# Patient Record
(demographics unavailable — no encounter records)

---

## 2024-11-14 NOTE — PLAN
[FreeTextEntry1] : Normal postmenopausal atrophic exam.  Patient will be seeing endocrinologist in January for evaluation of her bone density results  Weightbearing exercise has again been encouraged and she will follow-up in 1 year

## 2024-11-14 NOTE — PHYSICAL EXAM
[Chaperone Present] : A chaperone was present in the examining room during all aspects of the physical examination [[id number]] : A chaperone was present during the pelvic exam. [FreeTextEntry2] : Joselyn Lorenzo [Appropriately responsive] : appropriately responsive [Alert] : alert [No Acute Distress] : no acute distress [No Lymphadenopathy] : no lymphadenopathy [Soft] : soft [Non-tender] : non-tender [Non-distended] : non-distended [No HSM] : No HSM [No Lesions] : no lesions [No Mass] : no mass [Oriented x3] : oriented x3 [FreeTextEntry6] : The breasts are free of any masses axillary adenopathy or nipple discharge [Labia Majora] : normal [Labia Minora] : normal [Atrophy] : atrophy [Normal] : normal [Uterine Adnexae] : non-palpable [FreeTextEntry9] : Rectal exam and guaiac are negative

## 2024-11-14 NOTE — HISTORY OF PRESENT ILLNESS
[FreeTextEntry1] : Patient is here for her annual examination and has no gynecological or other health complaints.  She is scheduled to see an endocrinologist subsequent to our discussion about her last bone density test.  She states that she had a normal mammogram this past summer.  She states that she had a colonoscopy last year and was recommended to follow-up in 5 years by the gastroenterologist

## 2024-12-16 NOTE — ASSESSMENT
[FreeTextEntry1] : Reports increased since cessation of right ear blockage and pain since last visit, no drainage.  Exam today shows dry skin and wax accumulation right canal wall down cavity which was removed.  Epithelium overlying ET orifice was removed, resulting in improvement in symptoms.  Left ear canal patent and tympanic membrane intact.  Maintain dry ear precautions right ear, follow-up 3 months for repeat cleaning, patient would like to follow-up more frequently than every 6 months.

## 2025-01-06 NOTE — REVIEW OF SYSTEMS
[Recent Weight Loss (___ Lbs)] : recent weight loss: [unfilled] lbs [All other systems negative] : All other systems negative [Fatigue] : no fatigue [Blurred Vision] : no blurred vision [Neck Pain] : no neck pain [Chest Pain] : no chest pain [Palpitations] : no palpitations [Lower Ext Edema] : no lower extremity edema [Shortness Of Breath] : no shortness of breath [SOB on Exertion] : no shortness of breath on exertion [Nausea] : no nausea [Constipation] : no constipation [Abdominal Pain] : no abdominal pain [Vomiting] : no vomiting [Diarrhea] : no diarrhea [Joint Pain] : no joint pain [Muscle Weakness] : no muscle weakness [Headaches] : no headaches [Dizziness] : no dizziness [Tremors] : no tremors [Pain/Numbness of Digits] : no pain/numbness of digits [Polydipsia] : no polydipsia [Cold Intolerance] : no cold intolerance

## 2025-01-06 NOTE — HISTORY OF PRESENT ILLNESS
[Ibandronate (Boniva)] : Ibandronate [FreeTextEntry1] : 67-year-old Female with PMH gastric ulcer, chronic mastoiditis, conductive hearing loss came for evaluation of osteoporosis.   Patient had a DEXA scan in 2016 that showed osteopenia in Femoral neck -2.2 with normal BMD in spine.  She recently did a DEXA scan at the gynecology and was told that her DEXA scan showed that she has osteoporosis.  She does not have the DEXA scan.  History of Fractures: Foot fracture 11 years ago Age at menopause: age 54 in 2010 History of eating disorders, malnutrition or problems with nutrient absorption:  History of steroid use: Few months ago she was using prednisone 2 weeks ago History of use of anti-convulsant: Denies Recent falls or unstable gait: At fall, hip pain,  Loss of height: 5,2 Family history of osteoporosis or fractures: Sister broke hip, developed osteoporosis Past medical treatment for osteoporosis: Has never been Rx for osteoporosis Current treatment of osteoporosis: None Most recent DXA:  Report not available. Patient will contact the PCP office and will get the DEXA reports Vitamin D intake: 200 IU every other day Calcium intake:  Exercise: walks  1 hour every day Diet: Drink milk daily , sometimes eat yogurt Dental care: Has seen dentist 5 months ago, denies major dental problems  ROS: Denies loss of height.  Denies back or long bone pain.  Yes recently had a fall, denies fracture or gait instability.  Denies flank pain.  Denies hematuria. Denies smoking. Denies excessive alcohol intake.  Denies current history of steroid use.  Denies malabsorption. Denies history of hyperthyroidism/hyperparathyroidism. Denies history of kidney stones. Reports her sister had a traumatic fracture of hip and now have osteoporosis of that bone. Denies maternal osteoporosis

## 2025-01-06 NOTE — PHYSICAL EXAM
[Alert] : alert [Well Nourished] : well nourished [No Acute Distress] : no acute distress [Well Developed] : well developed [Normal Sclera/Conjunctiva] : normal sclera/conjunctiva [Normal Oropharynx] : the oropharynx was normal [Supple] : the neck was supple [Thyroid Not Enlarged] : the thyroid was not enlarged [No Thyroid Nodules] : no palpable thyroid nodules [No Edema] : no peripheral edema [Pedal Pulses Normal] : the pedal pulses are present [Normal Bowel Sounds] : normal bowel sounds [Not Tender] : non-tender [Not Distended] : not distended [Soft] : abdomen soft [Normal Anterior Cervical Nodes] : no anterior cervical lymphadenopathy [Normal Posterior Cervical Nodes] : no posterior cervical lymphadenopathy [No Spinal Tenderness] : no spinal tenderness [Spine Straight] : spine straight [No Stigmata of Cushings Syndrome] : no stigmata of Cushings Syndrome [Normal Gait] : normal gait [Normal Strength/Tone] : muscle strength and tone were normal [No Rash] : no rash [No Sensory Deficits] : the sensory exam was normal to light touch and pinprick [Normal Reflexes] : deep tendon reflexes were 2+ and symmetric [No Tremors] : no tremors [Oriented x3] : oriented to person, place, and time [Acanthosis Nigricans] : no acanthosis nigricans

## 2025-01-06 NOTE — ASSESSMENT
[FreeTextEntry1] : Advised patient to fax the DEXA scan report. obtain new baseline DEXA - check Ca, phos, Cr, Alk phos, albumin, 25OH vitamin D, PTH, TSH - continue calcium + vitamin D supplements Once the DEXA scan report received will plan to start anti osteoporotic medication. Discussed at length different medication options/ risks/ benefits and side effects including reclast, evenity and prolia. Patient cannot tolerate oral bisphosphonate due to hx of severe GERD.  RTC in one month

## 2025-01-16 NOTE — PHYSICAL EXAM
[Well Developed] : well developed [Well Nourished] : well nourished [Conjunctiva] : the conjunctiva were normal in both eyes [PERRL] : pupils were equal in size, round, and reactive to light [EOM Intact] : extraocular movements were intact [Normal Oropharynx] : the oropharynx was normal [Normal Nasal Mucosa] : the nasal mucosa was normal [Normal Appearance] : was normal in appearance [Neck Supple] : was supple [Enlarged Diffusely] : was not enlarged [Rate ___] : at [unfilled] breaths per minute [Normal Rhythm/Effort] : normal respiratory rhythm and effort [Clear Bilaterally] : the lungs were clear to auscultation bilaterally [Normal to Percussion] : the lungs were normal to percussion [Heart Rate ___] : [unfilled] bpm [Rhythm Regular] : regular [Normal Rate] : normal [Normal S1] : normal S1 [Normal S2] : normal S2 [S3] : no S3 [S4] : no S4 [No Murmur] : no murmurs heard [No Pitting Edema] : no pitting edema present [Rt] : varicose veins of the right leg noted [Lt] : varicose veins of the left leg noted [Right Carotid Bruit] : no bruit heard over the right carotid [Left Carotid Bruit] : no bruit heard over the left carotid [Right Femoral Bruit] : no bruit heard over the right femoral artery [Left Femoral Bruit] : no bruit heard over the left femoral artery [2+] : left 2+ [No Abnormalities] : the abdominal aorta was not enlarged and no bruit was heard [Bruit] : no bruit heard [Examination Of The Breasts] : a normal appearance [No Discharge] : no discharge [Soft, Nontender] : the abdomen was soft and nontender [No Mass] : no masses were palpated [No HSM] : no hepatosplenomegaly noted [Postauricular Lymph Nodes Enlarged Bilaterally] : nodes not enlarged [Preauricular Lymph Nodes Enlarged Bilaterally] : nodes not enlarged [Submandibular Lymph Nodes Enlarged Bilaterally] : nodes not enlarged [Suboccipital Lymph Nodes Enlarged Bilaterally] : nodes not enlarged [Cervical Lymph Nodes Enlarged Posterior Bilaterally] : nodes not enlarged [Submental Lymph Nodes Enlarged] : nodes not enlarged [Cervical Lymph Nodes Enlarged Anterior Bilaterally] : nodes not enlarged [Supraclavicular Lymph Nodes Enlarged Bilaterally] : nodes not enlarged [Axillary Lymph Nodes Enlarged Bilaterally] : nodes not enlarged [Epitrochlear Lymph Nodes Enlarged Bilaterally] : nodes not enlarged [Femoral Lymph Nodes Enlarged Bilaterally] : nodes not enlarged [Inguinal Lymph Nodes Enlarged Bilaterally] : nodes not enlarged [No Lymphangitis] : no lymphangitis observed [Normal Kyphosis] : normal kyphosis [No Visual Abnormalities] : no visible abnormalities [Normal Lordosis] : normal lordosis [No Scoliosis] : no scoliosis [No Tenderness to Palpation] : no spine tenderness on palpation [No Masses] : no masses [Full ROM] : full ROM [No Pain with ROM] : no pain with motion in any direction [Intact] : all reflexes within normal limits bilaterally [Normal Station and Gait] : the gait and station were normal [Normal Motor Tone] : the muscle tone was normal [Involuntary Movements] : no involuntary movements were seen [Normal Scalp] : inspection of the scalp showed no abnormalities [Examination Of The Hair] : texture and distribution of hair was normal [Abnormal Color] : normal color and pigmentation [Complexion Medium] : medium complexion [Skin Lesions 1] : no skin lesions were observed [Tattoo - Single] : no tattoos observed [Skin Turgor Decreased] : normal skin turgor [Normal] : the deep tendon reflexes were normal [Normal Mental Status] : the patient's orientation, memory, attention, language and fund of knowledge were normal [Appropriate] : appropriate [Impaired judgment] : intact judgment [Impaired Insight] : intact insight

## 2025-01-16 NOTE — HEALTH RISK ASSESSMENT
[Good] : ~his/her~  mood as  good [No] : In the past 12 months have you used drugs other than those required for medical reasons? No [One fall no injury in past year] : Patient reported one fall in the past year without injury [Little interest or pleasure doing things] : 1) Little interest or pleasure doing things [Feeling down, depressed, or hopeless] : 2) Feeling down, depressed, or hopeless [0] : 2) Feeling down, depressed, or hopeless: Not at all (0) [PHQ-2 Negative - No further assessment needed] : PHQ-2 Negative - No further assessment needed [Never] : Never [NO] : No [Patient reported mammogram was normal] : Patient reported mammogram was normal [Patient reported PAP Smear was normal] : Patient reported PAP Smear was normal [Patient reported colonoscopy was abnormal] : Patient reported colonoscopy was abnormal [HIV test declined] : HIV test declined [Hepatitis C test declined] : Hepatitis C test declined [None] : None [With Family] : lives with family [# of Members in Household ___] :  household currently consist of [unfilled] member(s) [Employed] : employed [High School] : high school [Single] : single [Feels Safe at Home] : Feels safe at home [Fully functional (bathing, dressing, toileting, transferring, walking, feeding)] : Fully functional (bathing, dressing, toileting, transferring, walking, feeding) [Fully functional (using the telephone, shopping, preparing meals, housekeeping, doing laundry, using] : Fully functional and needs no help or supervision to perform IADLs (using the telephone, shopping, preparing meals, housekeeping, doing laundry, using transportation, managing medications and managing finances) [Reports changes in hearing] : Reports changes in hearing [Smoke Detector] : smoke detector [Carbon Monoxide Detector] : carbon monoxide detector [Safety elements used in home] : safety elements used in home [FreeTextEntry1] : none  [de-identified] : walks daily 1 hr  [de-identified] : reg  [[id number]] : 0 [Change in mental status noted] : No change in mental status noted [Language] : denies difficulty with language [Behavior] : denies difficulty with behavior [Learning/Retaining New Information] : denies difficulty learning/retaining new information [Handling Complex Tasks] : denies difficulty handling complex tasks [Reasoning] : denies difficulty with reasoning [Spatial Ability and Orientation] : denies difficulty with spatial ability and orientation [Sexually Active] : not sexually active [Reports changes in vision] : Reports no changes in vision [Reports changes in dental health] : Reports no changes in dental health [Seat Belt] :  uses seat belt [Sunscreen] : does not use sunscreen [Travel to Developing Areas] : does not  travel to developing areas [TB Exposure] : is not being exposed to tuberculosis [Caregiver Concerns] : does not have caregiver concerns [MammogramDate] : 2024  [MammogramComments] : need to obtain  [PapSmearDate] : 11/24 [BoneDensityDate] : 7/24 [ColonoscopyComments] : polyp ascending colon tubular adenoma  [ColonoscopyDate] : 9/29/23 [de-identified] : last eye exam 1 yr  [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

## 2025-01-16 NOTE — HISTORY OF PRESENT ILLNESS
[FreeTextEntry1] : CPE  [de-identified] : pT IS A 67 YR OLD WOMAN WITH f Dyspepsia, hypothyroidism, positive PAULY, Vit D insufficiency, Incomplete RBBB, Sinus bradycardia, who comes in for annual wellness exam. she has developed tinnitus and went to ent  and evaluated.  she -denies any headaches, nausea, vomiting, fever, chills, sweats, chest pain, chest pressure, diarrhea, constipation, dysphagia, sour taste in the mouth, dizziness, leg swelling, leg pain, myalgias, arthralgias, itchy eyes, itchy ears, heartburn, or reflux.  She had gone to endocrinologist who states she had osteoporosis but bone density was not available  . I called and could not obtain report today will try again.   Pt has hx of gastric ulcer but refused fu egd   andunderstands risks  and need to  evaluate healing.

## 2025-01-16 NOTE — ASSESSMENT
[FreeTextEntry1] : health  She doesnt want flu vaccine  she will need dtap next yr . she is up to date with mammogram and bone density  and needs eye exam and dental . she has eye exam tomorrow.  She is up to date with colon cancer screening repeat  in 2028 .for tubular adenoma   2 bmi 21 healthy eating and exercise  3 osteoporosis  Exercise may decrease fracture risk by improving bone mass in premenopausal women and helping to maintain bone density in women who have been through menopause. Furthermore, exercise can strengthen your muscles, improve your balance, and make you less likely to have a fall that could lead to fracture or other injury. Most experts recommend exercising for at least 30 minutes three times per week. Many different types of exercise, including resistance training (eg, using free weights or resistance bands), jogging, jumping, and walking, are effective.Falling significantly increases the risk of osteoporotic fractures in older adults. Taking measures to prevent falls can decrease the risk of fractures. Such measures may include the following:  ?Removing loose rugs and electrical cords or any other loose items in the home that could lead to tripping, slipping, and falling.   ?Providing adequate lighting in all areas inside and around the home, including stairwells and entrance ways.   ?Avoiding walking on slippery surfaces, such as ice or wet or polished floors.   ?Avoiding walking in unfamiliar areas outside.   ?Reviewing drug regimens to replace medications that may increase the risk of falls with those that are less likely to do so.   ?Visiting an ophthalmologist or optometrist regularly to check your vision. The relationship between bone density and fracture risk in premenopausal women (ie, those who have not yet gone through menopause) is not well defined. A premenopausal woman with low bone density may have little increased risk of fracture. Thus, bone density alone should not be used to diagnose osteoporosis in a premenopausal woman; further evaluation for other potential causes of bone loss is generally recommended.  Bisphosphonates  Bisphosphonates are medications that slow the breakdown and removal of bone (ie, resorption). They are widely used for the prevention and treatment of osteoporosis in postmenopausal women.  These drugs need to be taken first thing in the morning on an empty stomach with a full 8 oz glass of plain (not sparkling) water. You then need to wait for a half hour or an hour, depending on which one you take, before eating or taking any other medications:  ?Alendronate (brand name: Fosamax) or risedronate (brand names: Actonel, Atelvia)  If you take either of these drugs, wait at least half an hour. (See 'Risedronate' below and 'Ibandronate' below.)   ?Ibandronate (brand name: Boniva)  If you take this drug, wait at least one hour. (See 'Ibandronate' below.)   These instructions help ensure that the drugs will be absorbed and also reduce the risk of side effects and potential complications.  A "delayed-release" formulation of risedronate is also available. Unlike immediate-release risedronate and other oral bisphosphonates, delayed-release risedronate is taken immediately after breakfast and with at least 4 ounces of water.  After taking any oral bisphosphonate, remain upright (sitting or standing) for at least 30 minutes to minimize the risk of acid reflux and other gastrointestinal side effects. (See 'Side effects of bisphosphonates' below.)  If you are at high risk for breaking a bone, you can safely take osteoporosis medicines for many years. However, most people can stop taking alendronate, risedronate, or ibandronate after five years. This is because these drugs have residual benefit, even after you stop them. This approach also minimizes side effects from long-term use. Your doctor will continue to monitor your bone density to determine if you need to start medication again.  Side effects of bisphosphonates  Most people who take bisphosphonates do not have any serious side effects related to the medication. However, it is important to closely follow the instructions for taking the medication; lying down or eating sooner than the recommended time after a dose increases the risk of stomach upset.  There has been concern about use of bisphosphonates in people who require invasive dental work. A problem known as osteonecrosis of the jaw has developed in people who used bisphosphonates. The risk of this problem is very small in people who take bisphosphonates for osteoporosis prevention and treatment. However, there is a slightly higher risk of this problem when higher doses of bisphosphonates are given into a vein during cancer treatment.  For people who take bisphosphonates to treat osteoporosis, experts do not think that it is necessary to stop the medication before invasive dental work (eg, tooth extraction or implant). However, people who take a bisphosphonate as part of a treatment for cancer should consult their doctor before having invasive dental work.  Taking bisphosphonates for a long time (eg, seven years or longer) can rarely increase the risk of an unusual type of femur (thigh bone) fracture. Taking bisphosphonates for up to five years for osteoporosis (the usual duration of treatment) is usually not associated with these "atypical" fractures, and the benefits outweigh the risk of this rare side effect.  Alendronate  Alendronate (brand names: Binosto, Fosamax) reduces the risk of vertebral and hip fractures, and it decreases the loss of height associated with vertebral fractures. It is available as a pill that is taken once per day or once per week.  Risedronate  Risedronate (brand names: Actonel, Atelvia) reduces the risk of both vertebral and hip fractures. Risedronate is approved for both prevention and treatment of osteoporosis. It can be taken once per day, once per week, or once per month.  Ibandronate  Although ibandronate reduces the risk of bone loss and vertebral fractures, there is no proof that it reduces the risk of hip fractures, so it is not recommended as often as alendronate and risedronate. Ibandronate (brand name: Boniva) can be used for prevention and treatment of osteoporosis. It is available as a pill that is taken once per day or once per month. It is also available as an injection that is given into a vein once every three months.  Zoledronic acid  A once-yearly, intravenous dose of zoledronic acid (sample brand name: Reclast) is also available for the treatment of osteoporosis. This medication is given into a vein (by "IV") over 15 minutes and is usually well tolerated. Zoledronic acid can improve bone density and decrease the risk of vertebral and hip fractures.  Side effects of zoledronic acid can include flu-like symptoms within 24 to 72 hours of the first dose. This may include a low-grade fever and muscle and joint pain. Treatment with a fever-reducing medication (acetaminophen) generally improves the symptoms. Subsequent doses typically cause milder symptoms.  Intravenous zoledronic acid is an appealing alternative for people who cannot tolerate oral bisphosphonates or who prefer a once yearly to a monthly, weekly, or daily regimen. Zoledronic acid is usually given for three years and then discontinued. Your doctor will monitor your bone density to see if it needs to be restarted.  "Estrogen-like" medications  Certain medications, known as selective estrogen receptor modulators (SERMs), produce some estrogen-like effects in the bone. These medications, which include raloxifene (brand name: Evista) and tamoxifen, provide protection against postmenopausal bone loss. In addition, SERMs decrease the risk of breast cancer in women who are at high risk. Raloxifene can be used for the prevention and treatment of osteoporosis in postmenopausal women, although it may be less effective in preventing bone loss than bisphosphonates or estrogen (see 'Hormone therapy' below). Tamoxifen is usually given to women with breast cancer to reduce the risk of recurrence, or to women who have never had breast cancer but are at high risk of developing it. (See "Patient education: Medications for the prevention of breast cancer (Beyond the Basics)".)  SERMs are not recommended for premenopausal women.  Hormone therapy  In the past, hormone therapy with estrogen or estrogen-progestin was consider 4 perforated right ear tm  fu with ent  and left ear wax  5. allergy she has  allergy on face along nasial labial folds.  and if flouride  i would expect it would be around her mouth lips   and the erythema is only nasolabial fold .    will test for allergies and aske pt to use tooth paste without fluoride  such as Toms.   6. bunion  not symptomatic.  7 hld  to lower  LDL and non HDL  cholesterol levels     1 limit your intake of foods full of saturated fats  , trans fats, and dietary cholesterol .  Food with a lot of saturated fate include butter, fatty flesh like red meat, full fat and low fat dairy  products  , palm oil and coconut oil .   If you see partially hydrogenated fat in the ingredient  list of food label that food has trans fats.  Top sources of dietary chol include egg yolks , organ meats and shell fish.  one Type of fat omega 3 Fatty acids has been shown to protect against heart disease  . Good sources are cold water fish like salmon, mackerel , halibut ., trout  herring and sardines.     Limit  your intake of meat , poultry and fish to no more than 3.5  ounces per day     Eat a lot more fiber rich foods  like beans , oats, barley fruits and vegetables   .  Food naturally rich in soluble fiber  are good at lowering cholesterol .    Excellent choices  are  oats , oat bran , barley , peas , yams sweet potatoes and legumes  or beans .  Good fruit sources are berries passion fruit, oranges pears,, apricots , apples  and nectarines  .    choose protein rich plant foods   such as legumes or beans nuts and seeds over meat.     Lose as much weight as possible and exercise   Take plant sterol supplements   .A Daily intake  of 1-2 gms  of plant sterols  lowers ldl .    Best choice is supplements  such as Cholestoff  by natures made   because they dont have calories  sugar trans fats   an salt  of many foods enriched with plant sterols.    Take  Metamucil or psyllium   .   Studies have shown 9-10 gms as daily of psyllium   the equivalent of  about  3 teaspoons  of sugar free Metamucil   reduced LDL levels  CAT scans are the only radiological modality to identify abnormalities w/in the lungs with regards to nodules/masses/lymph nodes. Risks, benefits were reviewed in detail. The guidelines for abnormalities include follow up CT scans at various intervals which could range from 6 weeks to 1 year intervals. If there is a change for the worse then consideration for a biopsy will be considered if you are a candidate. Second opinion evaluation with a thoracic surgeon or an interventional radiologist could be offered.  -Lung Cancer Screening is recommended for people between the ages of 55 and 80 with prior 30+ pack year smoking histories. There is irrefutable evidence for realization of lung cancer screening based on two large randomized control trials demonstrating relative reduction in lung cancer mortality for patients undergoing low-dose CT scanning. Risks and benefits reviewed with the patient.    8 gerd improved and takes ppi and h2 blocker intermittently when needed. discussed Rule of 2's; pt should avoid eating too much; too fast; too spicy; too lousy; less than two hours before bed  -Things to avoid including overeating, spicy foods, tight clothing, eating within three hours of bed, this list is not all inclusive.  -For treatment of reflux, possible options discussed including diet control, H2 blockers, PPIs, as well as coating motility agents discussed as treatment options. Timing of meals and proximity of last meal to sleep were discussed. If symptoms persist, a formal gastrointestinal evaluation is needed.  9. hematuria  test  ua

## 2025-01-16 NOTE — COUNSELING
[Fall prevention counseling provided] : Fall prevention counseling provided [Adequate lighting] : Adequate lighting [No throw rugs] : No throw rugs [Use proper foot wear] : Use proper foot wear [Sleep ___ hours/day] : Sleep [unfilled] hours/day [Engage in a relaxing activity] : Engage in a relaxing activity [Plan in advance] : Plan in advance [____ min/wk Activity] : [unfilled] min/wk activity [FreeTextEntry2] : bmi 21  weight 116

## 2025-02-06 NOTE — HISTORY OF PRESENT ILLNESS
[FreeTextEntry1] : 67-year-old Female with PMH gastric ulcer, chronic mastoiditis, conductive hearing loss came for evaluation of osteoporosis.  Patient had a DEXA scan in 2016 that showed osteopenia in Femoral neck -2.2 with normal BMD in spine. She recently did a DEXA scan at the gynecology and was told that her DEXA scan showed that she has osteoporosis. She does not have the DEXA scan. -2.2  -2.7  History of Fractures: Foot fracture 11 years ago Age at menopause: age 54 in 2010 History of eating disorders, malnutrition or problems with nutrient absorption: History of steroid use: Few months ago she was using prednisone 2 weeks ago History of use of anti-convulsant: Denies Recent falls or unstable gait: At fall, hip pain, years ago Loss of height: 5,2 Family history of osteoporosis or fractures: Sister broke hip, developed osteoporosis Past medical treatment for osteoporosis: Has never been Rx for osteoporosis Current treatment of osteoporosis: None Most recent DXA: Report not available. Patient will contact the PCP office and will get the DEXA reports Vitamin D intake: 200 IU every other day Calcium intake: Exercise: walks 1 hour every day Diet: Drink milk daily , sometimes eat yogurt Dental care: Has seen dentist 5 months ago, denies major dental problems  ROS: Denies loss of height. Denies back or long bone pain. Yes recently had a fall, denies fracture or gait instability. Denies flank pain. Denies hematuria. Denies smoking. Denies excessive alcohol intake. Denies current history of steroid use. Denies malabsorption. Denies history of hyperthyroidism/hyperparathyroidism. Denies history of kidney stones. Reports her sister had a traumatic fracture of hip and now have osteoporosis of that bone. Denies maternal osteoporosis    HAILEY MOHANLINDA is being seen for Osteoporosis. Past treatment has included Ibandronate.

## 2025-03-18 NOTE — ASSESSMENT
[FreeTextEntry1] : Exam today shows dry right canal without cavity, dry skin and debris accumulation removed in entirety.  Topical powder reapplied.  Bilateral facial nerve function normal.  Maintain dry ear precautions right ear, follow-up 4 months for ear cleaning.

## 2025-03-18 NOTE — PROCEDURE
[FreeTextEntry3] : Procedure note:  Right mastoid debridement.  Mar 18, 2025   Description of Procedure:  Mastoid debridement was performed under the operating microscope using otologic instrumentation.  The patient tolerated the procedure without complications.

## 2025-05-29 NOTE — ASSESSMENT
[FreeTextEntry1] : 1 back pain  Unless acute low back pain is caused by a serious medical condition (which is uncommon), it typically resolves fairly quickly, even if there is a bulging or herniated disc. Still, low back pain can make it hard to do your usual activities, and it can be frustrating to feel like you just have to wait for it to get better. Below are some simple things you can do that may help relieve your pain. Remaining active  Many people are afraid that they will hurt their back further or delay recovery by remaining active. However, remaining active, to the extent that you are able, is one of the best things you can do for your back. If you have severe pain, you may need to rest your back for a day or so. It may be most comfortable to lie on your back with a pillow under your knees and your head and shoulders elevated. For sleeping, you may want to lie on your side with your upper knee bent and a pillow between your knees. However, prolonged bed rest is not recommended. Studies have shown that people with low back pain recover faster when they remain active. Movement helps to relieve muscle spasms and prevents loss of muscle strength. While you should avoid strenuous activities and sports while you are in pain, it is fine to continue doing regular day-to-day activities and light exercises, such as walking. If certain activities cause your back to hurt too much, try something else instead. Heat  Using a heating pad or heated wrap can help with low back pain during the first few weeks. It is not clear if cold helps as well, but some people may find that it relieves pain temporarily. Modifications at work  Most experts recommend that people with low back pain continue to work so long as it is possible to avoid prolonged standing or sitting and heavy lifting. If your job does not allow you to sit or stand comfortably, you may need to take some time off work while you recover. While standing at work, stepping on a block of wood with one foot (and periodically alternating the foot on the block) may be helpful. Pain medications  You can try taking an over-the-counter medication to help relieve pain. Nonsteroidal antiinflammatory drugs (NSAIDs), such as ibuprofen (sample brand names: Advil, Motrin) and naproxen (brand name: Aleve), may work better than acetaminophen (brand name: Tylenol) for low back pain. If you do take pain medication, it may be more effective to take a dose on a regular basis for three to five days, rather than using the medication only when your pain becomes unbearable. Muscle relaxants (eg, cyclobenzaprine [brand name: Flexeril]) are prescription medications; while these may help relieve back pain, they can cause drowsiness and are probably no better than ibuprofen in relieving pain. Your health care provider can talk to you about whether muscle relaxants might help in your situation. They may be helpful before bedtime when used for a short time, ie, a week or two. People who need to be alert, such as while driving or operating machinery, should not use muscle relaxants. Opioids (drugs derived from morphine) are not recommended for most people with back pain. In rare situations, a health care provider might prescribe them for a few days if a person has severe pain that is not responding to other treatments, but they are generally not much more effective than NSAIDs. In addition, opioids have a relatively high risk of side effects and the potential to cause harm, including the risk of dependency and abuse. Exercise  Starting a new exercise program immediately after a new episode of low back pain will not speed recovery from the acute episode. However, there is evidence that exercise is beneficial in people with chronic back pain. Spinal manipulation  "Spinal manipulation" is a technique sometimes used by chiropractors, physical therapists, osteopaths, massage therapists, and others to relieve back pain. It involves moving the joints of the spine beyond the normal range of motion. Studies suggest that spinal manipulation may provide modest pain relief and improved function, and it generally appears to be safe if performed by an experienced professional. If you are interested in trying this approach, talk with your health care provider about how to integrate it into your treatment plan. Acupuncture  Acupuncture involves inserting very fine needles into specific points, as determined by traditional Chinese maps of the body's flow of energy. There is not consistent evidence that acupuncture is effective for people with acute low back pain; however, some people find it helpful. Massage  There is no evidence that massage is effective in treating acute low back pain. However, you may find that it is generally relaxing and helps you feel better temporarily. Psychological therapy  In some cases, mental health issues can contribute to low back pain. Psychological therapy has mostly been studied in the context of treating longer-term (chronic) back pain; however, it may be beneficial for some people with acute pain as well. Other treatments  You may have heard of other treatments that claim to relieve back pain. Unfortunately, most of these have not been proven to work or are only effective in specific situations. Examples include: ?Injections  Some clinicians recommend injections of a local anesthetic (numbing medication) into the soft tissues of the back, although it is not clear if these injections are effective. The areas targeted by these injections are called "trigger points." Trigger-point injections may be of benefit in some people with chronic back pain, but they are typically not recommended for treating acute pain. Injections of a glucocorticoid (steroid) medication are sometimes recommended for people with chronic low back pain with sciatica or radiculopathy The injection is given into the epidural space, which is located below the spinal cord. Epidural glucocorticoid injections do appear to improve pain slightly at two and six weeks after the injection, but not at 3, 6, or 12 months after the injection. There is no evidence that epidural steroid injections are helpful for people with back pain without sciatica. ?Corsets and braces  While wearable supportive garments may claim to help relieve or prevent low back pain, these are typically not effective. ?Traction  Traction involves the use of weights to realign or pull the spinal column into alignment. Clinical studies have shown no benefit from traction in the treatment of acute back pain. ?Switching to a firmer mattress  People often wonder if sleeping on a firmer mattress can help prevent or treat low back pain. Small studies have suggested that using a less firm mattress may actually be more likely to relieve pain; however, there is not enough evidence to support switching to a specific type of sleeping surface for this reason. ?Methods involving energy or electricity  Other interventions include ultrasound, interferential therapy, shortwave diathermy, transcutaneous electrical nerve stimulation, and low-level laser therapy, all of which involve applying energy to the skin's surface. None of these interventions have been proven to be effective, particularly during the first four to six weeks of an episode of back pain. CHRONIC LOW BACK PAIN TREATMENTWhile most people recover completely from an episode of acute low back pain, some people do go on to have longer-term pain. Chronic pain is typical  2 fall Make your home safer  To avoid falling at home, get rid of things that might make you trip or slip. This might include furniture, electrical cords, clutter, and loose rugs Keep your home well-lit so that you can easily see where you are going. Avoid storing things in high places so you don't have to reach or climb. ?Wear sturdy shoes that fit well  Wearing shoes with high heels or slippery soles, or shoes that are too loose, can lead to falls. Walking around in bare feet, or only socks, can also increase your risk of falling. ?Take vitamin D pills  Taking vitamin D might lower the risk of falls in older people. This is because vitamin D helps make bones and muscles stronger. Your doctor can talk to you about whether you should take extra vitamin D, and how much. ?Stay active  Exercising on a regular basis can help lower your risk of falling. It might also help prevent you from getting hurt if you do fall. It is best to do a few different activities that help with both strength and balance. There are many kinds of exercise that can be safe for older people. These include walking, swimming, and Markos Chi (a Chinese martial art that involves slow, gentle movements). ?Use a cane, walker, and other safety devices  If your doctor recommends that you use a cane or walker, be sure that it's the right size and you know how to use it. There are other devices that might help you avoid falling, too. These include grab bars or a sturdy seat for the shower, non-slip bath mats, and hand rails or treads for the stairs (to prevent slipping). If you worry that you could fall, there are also alarm buttons that let you call for help if you fall and can't get up. 3 osteoporosis check vit d level  and fu with Lily   4. gerd  improved  reduced ppi to 20 mg   and  hold famotadine   Gastroesophageal reflux disease (GERD) occurs when stomach acid frequently flows back into the tube connecting your mouth and stomach (esophagus). This backwash (acid reflux) can irritate the lining of your esophagus.  Many people experience acid reflux from time to time. GERD is mild acid reflux that occurs at least twice a week, or moderate to severe acid reflux that occurs at least once a week.   Most people can manage the discomfort of GERD with lifestyle changes and over-the-counter medications. But some people with GERD may need stronger medications or surgery to ease symptoms.  Symptoms  Common signs and symptoms of GERD include: A burning sensation in your chest (heartburn), usually after eating, which might be worse at night Chest pain Difficulty swallowing Regurgitation of food or sour liquid Sensation of a lump in your throat  If you have nighttime acid reflux, you might also experience: Chronic cough Laryngitis New or worsening asthma Disrupted sleep  When to see a doctor  Seek immediate medical care if you have chest pain, especially if you also have shortness of breath, or jaw or arm pain. These may be signs and symptoms of a heart attack.  Make an appointment with your doctor if you: Experience severe or frequent GERD symptoms Take over-the-counter medications for heartburn more than twice a week  Causes  GERD is caused by frequent acid reflux.  When you swallow, a circular band of muscle around the bottom of your esophagus (lower esophageal sphincter) relaxes to allow food and liquid to flow into your stomach. Then the sphincter closes again.  If the sphincter relaxes abnormally or weakens, stomach acid can flow back up into your esophagus. This constant backwash of acid irritates the lining of your esophagus, often causing it to become inflamed.  Risk factors  Conditions that can increase your risk of GERD include: Obesity Bulging of the top of the stomach up into the diaphragm (hiatal hernia) Pregnancy Connective tissue disorders, such as scleroderma Delayed stomach emptying  Factors that can aggravate acid reflux include: Smoking Eating large meals or eating late at night Eating certain foods (triggers) such as fatty or fried foods Drinking certain beverages, such as alcohol or coffee Taking certain medications, such as aspirin  Complications  Over time, chronic inflammation in your esophagus can cause: Narrowing of the esophagus (esophageal stricture). Damage to the lower esophagus from stomach acid causes scar tissue to form. The scar tissue narrows the food pathway, leading to problems with swallowing. An open sore in the esophagus (esophageal ulcer). Stomach acid can wear away tissue in the esophagus, causing an open sore to form. An esophageal ulcer can bleed, cause pain and make swallowing difficult. Precancerous changes to the esophagus (Carroll's esophagus). Damage from acid can cause changes in the tissue lining the lower esophagus. These changes are associated with an increased risk of esophageal cancer 5 ofelia positive  I explained that a positive OFELIA may occur secondary to polyclonal activation of the immune system following an infection, or it may be positive without any identifiable reason/disease in approximately 5 - 15% of the population.   There was no evidence of any underlying rheumatologic disease based on history or exam.  I did recommend specific serologic tests to evaluate whether the OFELIA is clinically significant.  I advised the family to call if new concerning symptoms arise (i.e. persistent unexplained fever, weight loss, unusual rash, mouth sores, joint pain/swelling/ warmth/ stiffness) so that reevaluation can be arranged as these symptoms may be a sign of a newly developed problem. 6  thyroid disorder  repat presently on no meds  due to normal tsh    7. glucose6 phosphatase def  family hx  of all sibling with this     8 hld  to lower  LDL and non HDL  cholesterol levels     1 limit your intake of foods full of saturated fats  , trans fats, and dietary cholesterol .  Food with a lot of saturated fate include butter, fatty flesh like red meat, full fat and low fat dairy  products  , palm oil and coconut oil .   If you see partially hydrogenated fat in the ingredient  list of food label that food has trans fats.  Top sources of dietary chol include egg yolks , organ meats and shell fish.  one Type of fat omega 3 Fatty acids has been shown to protect against heart disease  . Good sources are cold water fish like salmon, mackerel , halibut ., trout  herring and sardines.     Limit  your intake of meat , poultry and fish to no more than 3.5  ounces per day     Eat a lot more fiber rich foods  like beans , oats, barley fruits and vegetables   .  Food naturally rich in soluble fiber  are good at lowering cholesterol .    Excellent choices  are  oats , oat bran , barley , peas , yams sweet potatoes and legumes  or beans .  Good fruit sources are berries passion fruit, oranges pears,, apricots , apples  and nectarines  .    choose protein rich plant foods   such as legumes or beans nuts and seeds over meat.     Lose as much weight as possible and exercise   Take plant sterol supplements   .A Daily intake  of 1-2 gms  of plant sterols  lowers ldl .    Best choice is supplements  such as Cholestoff  by natures made   because they dont have calories  sugar trans fats   an salt  of many foods enriched with plant sterols.    Take  Metamucil or psyllium   .   Studies have shown 9-10 gms as daily of psyllium   the equivalent of  about  3 teaspoons  of sugar free Metamucil   reduced LDL levels  . 9 tinnitus  .tinnitus Behavioral therapies  A number of behavioral therapies can help a person to live with chronic tinnitus.  Tinnitus retraining therapy  Tinnitus retraining therapy (TRT) involves retraining the subconscious part of the auditory system to accept the sounds associated with tinnitus as normal, natural sounds rather than annoying sounds [4]. The goal is for the person to become unaware of their tinnitus unless they consciously choose to focus on it.  TRT is performed by experts in a tinnitus center; it includes counseling as well as the use of a wearable device that emits low-level noise and environmental sounds. Although TRT has demonstrated short-term success in many tinnitus sufferers, it requires a commitment to the program; the noise-generating device may need to be worn for one to two years. Studies show up to 80 percent of patients find some relief of their tinnitus with TRT.  Masking  Masking devices resemble hearing aids and produce low-level sounds, which help to reduce or eliminate the tinnitus noise in some patients [5]. Masking does not relieve tinnitus in all patients. Some people report a worsening of their tinnitus with masking. Those who do experience relief typically find that tinnitus returns after the masking device is removed.  Patients can also achieve a lesser degree of masking by listening to quiet music or creating low background noise with a radio on low volume, a fan, a white noise machine, or pillow speakers. This may be especially helpful for people with tinnitus that is bothersome in quiet environments.  Biofeedback and stress reduction  Biofeedback is a relaxation technique that teaches a person to control certain body functions, such as heart and breathing rate. Biofeedback may help people to manage tinnitus-related distress by changing their reaction to it. Some people experience relief of tinnitus symptoms once they are able to stop thinking of their tinnitus as bothersome or stressful [6].  Cognitive behavioral therapy  The goal of cognitive behavioral therapy (CBT) is to teach patients to manage their psychological responses to tinnitus. It involves using coping strategies, distraction skills, and relaxation techniques.  Other therapies  A number of other types of treatments for tinnitus have been studied, although none have been found to reliably be more effective than placebo. Nevertheless, many tinnitus support groups have members who are helped by these treatments. Individual patients who respond may be experiencing a true benefit.  ?Electrical stimulation  Electrical stimulation of certain parts of the inner ear can be accomplished by placing electrodes on the skin or using acupuncture needles (see below). There are different modes of delivering electrical stimulation, depending on the patient's situation and other factors such as whether hearing loss is present.   ?Acupuncture  Acupuncture involves inserting hair-thin metal needles into the skin at specific points on the body. It causes little to no pain. Acupuncture may be used alone or in combination with electric stimulation.   ?Repetitive transcranial magnetic stimulation  During repetitive transcranial magnetic stimulation (rTMS), a powerful magnetic field is used to stimulate the brain. This treatment shows promise for some tinnitus sufferers but is still experimental.   ?Herbal remedies  Combinations of herbs (also called botanicals) are often promoted as treatments for medical problems. Herbal medicines may come in the form of a powder, liquid, or pill. Ginkgo biloba and melatonin have both been studied for use in treating tinnitus. While some patients report benefit from these types of treatment, there are no studies with high rates of improvement in tinnitus from herbal remedies.   ?Vitamins and minerals  Vitamins and minerals that have been studied for treatment of tinnitus and inner-ear health include niacin (a B vitamin), zinc, and copper. While some patients report benefit from these types of treatment, there are no studies with high rates of improvement in tinnitus from vitamin or mineral remedies.

## 2025-05-29 NOTE — HISTORY OF PRESENT ILLNESS
[FreeTextEntry1] : fu [de-identified] : Pt is a 67 yr old woman with osteoporosis and has seen Lily and to start infusion  .  She -denies any headaches, nausea, vomiting, fever, chills, sweats, chest pain, chest pressure, diarrhea, constipation, dysphagia, sour taste in the mouth, dizziness, leg swelling, leg pain, myalgias, arthralgias, itchy eyes, itchy ears, heartburn, or reflux.  she had elevated chol 226  ldl 135  She had ofelia  elevated 1:160 and has anti thyroid ab  positive  and normal tsh.   Pt states she fell a few months ago at work on her back she slipped after cleaning with her foot and slipped  and wet.  She had pain then and then it resolved on its own.  Presently she has   lower back pain at waist line and only when she bends  and started one month ago.  The pain level is 8/10  when bending. She doesnt have pain climbing up and down stairs  .  She has it when she is sitting for a long time. she has not taken anything for it.  she doesnt have pain with walking.  She is taking pantoprazole and famotidine and takes it when needed.  she takes it once every 1-2 weeks. Pt states several of her siblings have glucose 6 phos def.

## 2025-05-29 NOTE — COUNSELING
[Fall prevention counseling provided] : Fall prevention counseling provided [Adequate lighting] : Adequate lighting [No throw rugs] : No throw rugs [Use proper foot wear] : Use proper foot wear [Sleep ___ hours/day] : Sleep [unfilled] hours/day [Engage in a relaxing activity] : Engage in a relaxing activity [Plan in advance] : Plan in advance

## 2025-05-29 NOTE — PHYSICAL EXAM
[No Acute Distress] : no acute distress [Well Nourished] : well nourished [Well Developed] : well developed [Well-Appearing] : well-appearing [Normal Sclera/Conjunctiva] : normal sclera/conjunctiva [PERRL] : pupils equal round and reactive to light [EOMI] : extraocular movements intact [Normal Outer Ear/Nose] : the outer ears and nose were normal in appearance [Normal Oropharynx] : the oropharynx was normal [No JVD] : no jugular venous distention [No Lymphadenopathy] : no lymphadenopathy [Supple] : supple [Thyroid Normal, No Nodules] : the thyroid was normal and there were no nodules present [No Respiratory Distress] : no respiratory distress  [No Accessory Muscle Use] : no accessory muscle use [Clear to Auscultation] : lungs were clear to auscultation bilaterally [Normal Rate] : normal rate  [Regular Rhythm] : with a regular rhythm [Normal S1, S2] : normal S1 and S2 [No Murmur] : no murmur heard [No Carotid Bruits] : no carotid bruits [No Abdominal Bruit] : a ~M bruit was not heard ~T in the abdomen [No Varicosities] : no varicosities [Pedal Pulses Present] : the pedal pulses are present [No Edema] : there was no peripheral edema [No Palpable Aorta] : no palpable aorta [No Extremity Clubbing/Cyanosis] : no extremity clubbing/cyanosis [Soft] : abdomen soft [Non Tender] : non-tender [Non-distended] : non-distended [No HSM] : no HSM [Normal Bowel Sounds] : normal bowel sounds [Normal Posterior Cervical Nodes] : no posterior cervical lymphadenopathy [Normal Anterior Cervical Nodes] : no anterior cervical lymphadenopathy [No CVA Tenderness] : no CVA  tenderness [No Spinal Tenderness] : no spinal tenderness [Normal Kyphosis] : normal kyphosis [No Visual Abnormalities] : no visible abnormalities [Normal Lordosis] : normal lordosis [No Scoliosis] : no scoliosis [No Tenderness to Palpation] : no spine tenderness on palpation [No Masses] : no masses [Full ROM] : full ROM [No Pain with ROM] : no pain with motion in any direction [Intact] : all reflexes within normal limits bilaterally [No Joint Swelling] : no joint swelling [Grossly Normal Strength/Tone] : grossly normal strength/tone [No Rash] : no rash [Coordination Grossly Intact] : coordination grossly intact [No Focal Deficits] : no focal deficits [Normal Gait] : normal gait [Deep Tendon Reflexes (DTR)] : deep tendon reflexes were 2+ and symmetric [Normal Affect] : the affect was normal [Normal Insight/Judgement] : insight and judgment were intact [Normal] : affect was normal and insight and judgment were intact

## 2025-05-29 NOTE — HEALTH RISK ASSESSMENT
[No] : In the past 12 months have you used drugs other than those required for medical reasons? No [No falls in past year] : Patient reported no falls in the past year [Little interest or pleasure doing things] : 1) Little interest or pleasure doing things [Feeling down, depressed, or hopeless] : 2) Feeling down, depressed, or hopeless [0] : 2) Feeling down, depressed, or hopeless: Not at all (0) [PHQ-2 Negative - No further assessment needed] : PHQ-2 Negative - No further assessment needed [de-identified] : endo Lily dillard  [de-identified] : walks  [de-identified] : reg [[id number]] : 0 [Never] : Never

## 2025-07-28 NOTE — ASSESSMENT
[FreeTextEntry1] : Patient developed side effects after getting IV Reclast infusion including hypocalcemia and hypokalemia. Advised patient to start calcium supplements 600 mg twice daily, advised to start taking vitamin D 1000 international unit daily.  Patient does not want to take large potassium pills.  Discussed to increase potassium containing foods such as banana. Check the CMP this week. Will discontinue the IV Reclast and will discuss other options of osteoporosis in future. Advised patient to inform if she is unable to swallow the Cefuroxime for 7 days.  Will prescribe another third-generation cephalosporin.  Advised to follow up with PCP for cellulitis Rx.  .  RTC in  Aug 2025

## 2025-07-28 NOTE — HISTORY OF PRESENT ILLNESS
[FreeTextEntry1] : 67-year-old Female with PMH gastric ulcer, chronic mastoiditis, conductive hearing loss came for osteoporosis follow-up  Osteoporosis history  Patient had a DEXA scan in 2016 that showed osteopenia in Femoral neck -2.2 with normal BMD in spine. She recently did a DEXA scan at the gynecology in August 2024 that showed L1-L4 T-score : -2.2 Left femoral neck T-score :-2.7  History of Fractures: Traumatic foot fracture 11 years ago Age at menopause: age 54 in 2010 History of eating disorders, malnutrition or problems with nutrient absorption: History of steroid use: None History of use of anti-convulsant: Denies Recent falls or unstable gait: She had a mechanical fall 1 year ago Loss of height: 5.2 Family history of osteoporosis or fractures: Sister had a hip fracture and then developed osteoporosis Past medical treatment for osteoporosis: Has never been on Rx for osteoporosis Current treatment of osteoporosis: Patient received IV Reclast in first week of July 2025 and then she developed fever for 2 days and myalgias that started in 1 to 2 weeks.  Her other symptoms were severe headache and bilateral lower leg muscle stiffness.  She then developed right leg swelling, redness, warmth and pain.  She went to the ED 2 days ago and was found to have hypokalemia with potassium level of 3. 4 and hypocalcemia with serum calcium level of 8.1.  The ultrasound of the leg showed no DVT however she was treated with IV antibiotic for cellulitis.  She was advised to take potassium chloride tablet however she refused she was discharged on cefuroxime.  Patient is now taking cefuroxime tablet however he states is too big in size and she has to cut it with a pill cutter.  He states she cannot swallow the big pills.  She has eaten 2 bananas to improve her potassium level. She still has symptoms of fatigue and headache, reports her leg swelling has improved however still its red and inflamed. Most recent DXA: As above Vitamin D intake: 200 IU every other day Calcium intake: Does not take Exercise: walks 1 hour every day Diet: Drink milk daily , sometimes eat yogurt Dental care: Has seen dentist 5 months ago, denies major dental problems  ROS: Fatigue, headache, myalgias and right leg swelling Reports her sister had a traumatic fracture of hip and now have osteoporosis of that bone. Denies maternal osteoporosis

## 2025-07-28 NOTE — PHYSICAL EXAM
[Alert] : alert [Well Nourished] : well nourished [No Acute Distress] : no acute distress [Well Developed] : well developed [Normal Sclera/Conjunctiva] : normal sclera/conjunctiva [Normal Oropharynx] : the oropharynx was normal [Supple] : the neck was supple [Thyroid Not Enlarged] : the thyroid was not enlarged [No Thyroid Nodules] : no palpable thyroid nodules [No Respiratory Distress] : no respiratory distress [No Edema] : no peripheral edema [Pedal Pulses Normal] : the pedal pulses are present [Normal Bowel Sounds] : normal bowel sounds [Not Tender] : non-tender [Not Distended] : not distended [Soft] : abdomen soft [Normal Anterior Cervical Nodes] : no anterior cervical lymphadenopathy [No Spinal Tenderness] : no spinal tenderness [Spine Straight] : spine straight [No Stigmata of Cushings Syndrome] : no stigmata of Cushings Syndrome [Normal Strength/Tone] : muscle strength and tone were normal [No Rash] : no rash [No Tremors] : no tremors [Oriented x3] : oriented to person, place, and time [Acanthosis Nigricans] : no acanthosis nigricans [Foot Ulcers] : no foot ulcers [de-identified] : Right leg mildly swelling in left leg, no redness noticed however it is warm and tender

## 2025-07-28 NOTE — PHYSICAL EXAM
[Alert] : alert [Well Nourished] : well nourished [No Acute Distress] : no acute distress [Well Developed] : well developed [Normal Sclera/Conjunctiva] : normal sclera/conjunctiva [Normal Oropharynx] : the oropharynx was normal [Supple] : the neck was supple [Thyroid Not Enlarged] : the thyroid was not enlarged [No Thyroid Nodules] : no palpable thyroid nodules [No Respiratory Distress] : no respiratory distress [No Edema] : no peripheral edema [Pedal Pulses Normal] : the pedal pulses are present [Normal Bowel Sounds] : normal bowel sounds [Not Tender] : non-tender [Not Distended] : not distended [Soft] : abdomen soft [Normal Anterior Cervical Nodes] : no anterior cervical lymphadenopathy [No Spinal Tenderness] : no spinal tenderness [Spine Straight] : spine straight [No Stigmata of Cushings Syndrome] : no stigmata of Cushings Syndrome [Normal Strength/Tone] : muscle strength and tone were normal [No Rash] : no rash [No Tremors] : no tremors [Oriented x3] : oriented to person, place, and time [Acanthosis Nigricans] : no acanthosis nigricans [Foot Ulcers] : no foot ulcers [de-identified] : Right leg mildly swelling in left leg, no redness noticed however it is warm and tender